# Patient Record
Sex: FEMALE | Race: OTHER | NOT HISPANIC OR LATINO | URBAN - METROPOLITAN AREA
[De-identification: names, ages, dates, MRNs, and addresses within clinical notes are randomized per-mention and may not be internally consistent; named-entity substitution may affect disease eponyms.]

---
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
## 2025-02-13 ENCOUNTER — EMERGENCY (EMERGENCY)
Facility: HOSPITAL | Age: 20
LOS: 1 days | Discharge: HOME | End: 2025-02-13
Attending: EMERGENCY MEDICINE | Admitting: EMERGENCY MEDICINE
Payer: COMMERCIAL

## 2025-02-13 PROCEDURE — 99284 EMERGENCY DEPT VISIT MOD MDM: CPT

## 2025-02-14 VITALS
SYSTOLIC BLOOD PRESSURE: 134 MMHG | WEIGHT: 163.14 LBS | HEART RATE: 112 BPM | RESPIRATION RATE: 18 BRPM | OXYGEN SATURATION: 98 % | HEIGHT: 63 IN | DIASTOLIC BLOOD PRESSURE: 84 MMHG | TEMPERATURE: 99 F

## 2025-02-14 VITALS — HEART RATE: 98 BPM

## 2025-02-14 PROBLEM — Z78.9 OTHER SPECIFIED HEALTH STATUS: Chronic | Status: ACTIVE | Noted: 2022-05-02

## 2025-02-14 LAB — HCG UR QL: NEGATIVE — SIGNIFICANT CHANGE UP

## 2025-02-14 PROCEDURE — 81025 URINE PREGNANCY TEST: CPT

## 2025-02-14 PROCEDURE — 99283 EMERGENCY DEPT VISIT LOW MDM: CPT

## 2025-02-14 RX ORDER — PREDNISONE 5 MG/1
1 TABLET ORAL
Qty: 4 | Refills: 0
Start: 2025-02-14 | End: 2025-02-17

## 2025-02-14 RX ORDER — FAMOTIDINE 10 MG/ML
20 INJECTION INTRAVENOUS ONCE
Refills: 0 | Status: COMPLETED | OUTPATIENT
Start: 2025-02-14 | End: 2025-02-14

## 2025-02-14 RX ORDER — PREDNISONE 5 MG/1
60 TABLET ORAL ONCE
Refills: 0 | Status: COMPLETED | OUTPATIENT
Start: 2025-02-14 | End: 2025-02-14

## 2025-02-14 RX ORDER — EPINEPHRINE 5 MG/ML
0.3 VIAL (ML) INJECTION
Qty: 1 | Refills: 0
Start: 2025-02-14

## 2025-02-14 RX ORDER — DIPHENHYDRAMINE HCL 25 MG
50 CAPSULE ORAL ONCE
Refills: 0 | Status: COMPLETED | OUTPATIENT
Start: 2025-02-14 | End: 2025-02-14

## 2025-02-14 RX ADMIN — PREDNISONE 60 MILLIGRAM(S): 5 TABLET ORAL at 01:22

## 2025-02-14 RX ADMIN — FAMOTIDINE 20 MILLIGRAM(S): 10 INJECTION INTRAVENOUS at 01:22

## 2025-02-14 RX ADMIN — Medication 50 MILLIGRAM(S): at 01:22

## 2025-02-14 NOTE — ED PROVIDER NOTE - OBJECTIVE STATEMENT
19-year-old female complaining of facial swelling lip swelling today.  States the lip swelling happened around 4 PM after she got some blood test done at the allergist office and then started having facial swelling later tonight.  Took an Allegra prior to arrival.  Denies any difficulty breathing wheezing.  Denies any nausea vomiting abdominal pain.  Has been dealing with urticarial hives for the past several weeks.  Last finished course of steroids last week.

## 2025-02-14 NOTE — ED PROVIDER NOTE - PHYSICAL EXAMINATION
Gen: Alert, NAD  Head/eyes: NC/AT, PERRL, +facial swelling, upper lip swelling  ENT: airway patent, no tongue swelling  Neck: supple  Pulm/lung: Bilateral clear BS  CV/heart: RRR  GI/Abd: soft, NT/ND, +BS, no guarding/rebound tenderness  Musculoskeletal: no edema/erythema/cyanosis  Skin: no rash  Neuro: AAOx3, grossly intact

## 2025-02-14 NOTE — ED PROVIDER NOTE - NSFOLLOWUPINSTRUCTIONS_ED_ALL_ED_FT
1) Follow-up with your allergist. Call today / next business day for prompt follow-up.  2) Return to Emergency room for any worsening or persistent pain, weakness, fever, or any other concerning symptoms.  3) See attached instruction sheets for additional information, including information regarding signs and symptoms to look out for, reasons to seek immediate care and other important instructions.  4) Follow-up with any specialists as discussed / noted as well.   5) Benadryl 25mg every 8 hours as needed   6) Prednisone 50mg once a day for next 4 days.    An allergy is when your body reacts to something that bothers it (allergen).    Allergies often affect the nose, eyes, skin, and stomach. They cannot spread from person to person. Allergies can start at any age. Sometimes, they go away as you get older.    What are the causes?  Outdoor things, like pollen, car fumes, and mold.  Indoor things, like dust, smoke, mold, and pets.  Foods.  Medicines.  Things that bother your skin. These include perfumes and bug bites.  What increases the risk?  Having family members with allergies or asthma.  What are the signs or symptoms?  Allergies may cause:  A runny nose, stuffy nose, or sneezing.  An itchy mouth, ears, or throat.  A feeling of mucus dripping down the back of your throat.  A sore throat.  Eyes that itch or are red, watery, or puffy.  A skin rash or red, swollen areas of skin (hives).  Stomach cramps or bloating.  If you have a very bad allergic reaction (anaphylactic reaction) to food, medicine, or bug bites, you may also have:  A red face.  Coughing.  Swollen lips, tongue, or mouth.  A tight or swollen throat.  Chest pain. Your chest may feel tight. Your heart may beat too fast.  Trouble breathing.  Pain in your belly (abdomen). You may vomit or have watery poop (diarrhea).  Fainting. Or you may feel dizzy.  If you have a very bad reaction to an allergy, you should get help right away.    How is this treated?  A person putting eye drops in an eye.  A person using nasal spray.  You may need to use:  Cold, wet cloths. These can help with itching and swelling.  Eye drops, nose sprays, or skin creams.  A saline solution to wash out your nose. Saline solutions are made of salt and water.  A humidifier.  Medicines.  You may need to change the foods you eat.  You may be given allergy shots, or a small bit of the allergen may be put under your tongue. These treatments can help your body get used to the allergen.  If you have a very bad allergic reaction, you may need to use an auto-injector pen.  An auto-injector pen is a device filled with medicine. It gives you an emergency shot of epinephrine.  Your doctor will teach you how to use it.  Follow these instructions at home:  Medicines    A person using an auto-injector pen in the thigh.  Take or apply over-the-counter and prescription medicines only as told by your doctor.  Keep an auto-injector pen with you all the time if you might have a very bad allergic reaction.  Eating and drinking    Follow instructions from your doctor about what you may eat and drink.  Drink enough fluid to keep your pee (urine) pale yellow.  General instructions    If you have ever had a bad reaction, wear a medical alert bracelet or necklace.  Stay away from the things you are allergic to.  Keep all follow-up visits. Your doctor will check on how you are doing and talk about treatment options with you.  Contact a doctor if:  You do not get better with treatment.  Get help right away if:  You have a very bad allergic reaction.  You use your auto-injector pen. You must go to the hospital even if the medicine seems to be working.  These symptoms may be an emergency. Use the auto-injector pen right away. Then call 911.  Do not wait to see if the symptoms will go away.  Do not drive yourself to the hospital.  This information is not intended to replace advice given to you by your health care provider. Make sure you discuss any questions you have with your health care provider.

## 2025-02-14 NOTE — ED ADULT TRIAGE NOTE - CHIEF COMPLAINT QUOTE
"I have been in and out of urgent care, my primary, and I had blood work taken today because I have been getting hives. My face is now swollen"

## 2025-02-14 NOTE — ED ADULT NURSE NOTE - CAS EDN INTEG ASSESS
[No Acute Distress] : no acute distress [Well Nourished] : well nourished [Well Developed] : well developed [Well-Appearing] : well-appearing [Normal Sclera/Conjunctiva] : normal sclera/conjunctiva [PERRL] : pupils equal round and reactive to light [EOMI] : extraocular movements intact [Normal Outer Ear/Nose] : the outer ears and nose were normal in appearance [Normal TMs] : both tympanic membranes were normal [No JVD] : no jugular venous distention [No Lymphadenopathy] : no lymphadenopathy [Supple] : supple [Thyroid Normal, No Nodules] : the thyroid was normal and there were no nodules present [No Respiratory Distress] : no respiratory distress  [No Accessory Muscle Use] : no accessory muscle use [Clear to Auscultation] : lungs were clear to auscultation bilaterally [Normal Rate] : normal rate  [Regular Rhythm] : with a regular rhythm [Normal S1, S2] : normal S1 and S2 [No Murmur] : no murmur heard [Pedal Pulses Present] : the pedal pulses are present [No Edema] : there was no peripheral edema [No Palpable Aorta] : no palpable aorta [No Extremity Clubbing/Cyanosis] : no extremity clubbing/cyanosis [Soft] : abdomen soft [Non Tender] : non-tender [Non-distended] : non-distended [No Masses] : no abdominal mass palpated [No HSM] : no HSM [Normal Bowel Sounds] : normal bowel sounds [Normal Axillary Nodes] : no axillary lymphadenopathy [Normal Posterior Cervical Nodes] : no posterior cervical lymphadenopathy [Normal Anterior Cervical Nodes] : no anterior cervical lymphadenopathy [No Joint Swelling] : no joint swelling [Grossly Normal Strength/Tone] : grossly normal strength/tone [No Rash] : no rash [Coordination Grossly Intact] : coordination grossly intact [No Focal Deficits] : no focal deficits [Normal Gait] : normal gait [Normal Affect] : the affect was normal [Normal Insight/Judgement] : insight and judgment were intact [No Carotid Bruits] : no carotid bruits - - -

## 2025-02-14 NOTE — ED PROVIDER NOTE - PATIENT PORTAL LINK FT
You can access the FollowMyHealth Patient Portal offered by [facility] by registering at the following website: [email or web address]. By joining [employer]’s FollowMyHealth portal, you will also be able to view your health information using other applications (apps) compatible with our system.

## 2025-02-14 NOTE — ED PROVIDER NOTE - CLINICAL SUMMARY MEDICAL DECISION MAKING FREE TEXT BOX
19-year-old female complaining of facial swelling lip swelling today.  States the lip swelling happened around 4 PM after she got some blood test done at the allergist office and then started having facial swelling later tonight.  Took an Allegra prior to arrival.  Denies any difficulty breathing wheezing.  Denies any nausea vomiting abdominal pain.  Has been dealing with urticarial hives for the past several weeks.  Last finished course of steroids last week.    allergic reaction/angioedema, PO benadryl, prednisone, pepcid, reassess

## 2025-02-14 NOTE — ED ADULT NURSE NOTE - OBJECTIVE STATEMENT
"I have been in and out of urgent care, my primary, and I had blood work taken today because I have been getting hives. My face is now swollen"  allergic reaction

## 2025-02-14 NOTE — ED ADULT NURSE NOTE - AS SC BRADEN FRICTION
Pharmacy called today Patient came in for Shingles vaccine and the pharmacy staff told her it would be okay to get the vaccine after efudex. Hemanth Harrison wanted them to call and ask Dr. Gloria La,  Per Dr. Gloria La wait 3 weeks from stopping efudex just to be on the safe side since she is worried about it. She stopped using the Efudex yesterday.  No further questions or concerns (3) no apparent problem